# Patient Record
Sex: FEMALE | Race: WHITE | NOT HISPANIC OR LATINO | URBAN - METROPOLITAN AREA
[De-identification: names, ages, dates, MRNs, and addresses within clinical notes are randomized per-mention and may not be internally consistent; named-entity substitution may affect disease eponyms.]

---

## 2017-05-17 ENCOUNTER — ALLSCRIPTS OFFICE VISIT (OUTPATIENT)
Dept: OTHER | Facility: OTHER | Age: 77
End: 2017-05-17

## 2017-09-14 ENCOUNTER — ALLSCRIPTS OFFICE VISIT (OUTPATIENT)
Dept: OTHER | Facility: OTHER | Age: 77
End: 2017-09-14

## 2018-01-11 NOTE — PSYCH
Psych Med Mgmt    Appearance: was calm and cooperative  Observed mood: mood appropriate  Observed mood: affect appropriate  Speech: a normal rate  Thought processes: coherent/organized  Hallucinations: no hallucinations present  Thought Content: no delusions  Abnormal Thoughts: The patient has no suicidal thoughts and no homicidal thoughts  Orientation: The patient is oriented to person, place and time  Recent and Remote Memory: short term memory intact and long term memory intact  Attention Span And Concentration: concentration intact  Insight: Insight intact  Judgment: Her judgment was intact  Muscle Strength And Tone  Normal gait and station  Treatment Recommendations: Since on Neurontin 300 mg po tid will reduce klonopin to 0 5 mg po bid prn and 2 po qhs  Prozac 40 mg po qd  Zyprexa 10 mg po qhs  Risks, Benefits And Possible Side Effects Of Medications: Risks, benefits, and possible side effects of medications explained to patient and patient verbalizes understanding  Discussed with patient the risks of sedation, respiratory depression, impairment of ability to drive and potential for abuse and addiction related to treatment with benzodiazepine medications  The patient understands risk of treatment with benzodiazepine medications, agrees to not drive if feels impaired and agrees to take medications as prescribed  She reports normal appetite, normal energy level, no weight change and normal number of sleep hours  Pt seen for follow up Bipolar DIsorder  She had a bowel blockage 2 months ago and was at [facility] for 5 days for this  She also has an incisional hernia  Pt seen with spouse today and overall appears at her baseline psychiatrically  Sleeping well and appetite adequate- no anxiety or panic attacks  No depressive episodes recently  She does housework and spends time with family     She was started on Neurontin 300 mg po tid yesterday for neuropathy and spouse notes some sleepiness from that  Vitals  Signs   Recorded: 22Nov2016 03:42PM   Heart Rate: 65  Respiration: 16  Systolic: 267  Diastolic: 60  Height: 5 ft 1 5 in  Weight: 158 lb   BMI Calculated: 29 37  BSA Calculated: 1 72    DSM    Provisional Diagnosis: Bipolar Disorder  Assessment    1  Bipolar affective disorder, current episode mild (296 80) (F31 9)    Plan    1  From  ClonazePAM 0 5 MG Oral Tablet 1 tab po bid and 2 po qhs To   ClonazePAM 0 5 MG Oral Tablet 1 tab po bid as needded for anxiety and 2 po qhs   2  FLUoxetine HCl - 40 MG Oral Capsule; 1 cap po QD    Review of Systems    Constitutional: No fever, no chills, feels well, no tiredness, no recent weight gain or loss  Cardiovascular: no complaints of slow or fast heart rate, no chest pain, no palpitations  Respiratory: no complaints of shortness of breath, no wheezing, no dyspnea on exertion  Gastrointestinal: no complaints of abdominal pain, no constipation, no nausea, no diarrhea, no vomiting  Genitourinary: no complaints of dysuria, no incontinence, no pelvic pain, no urinary frequency  Active Problems    1  Bipolar affective disorder, current episode mild (296 80) (F31 9)    Allergies    1  Darvon   2  Sulfa Drugs    Current Meds   1  ClonazePAM 0 5 MG Oral Tablet; 1 tab po bid and 2 po qhs;   Therapy: 37SUE2878 to (Last Rx:22Xqy2321)  Requested for: 07JAU4473 Ordered   2  Fenofibrate 160 MG Oral Tablet; Therapy: 87ZAW5069 to Recorded   3  FLUoxetine HCl - 40 MG Oral Capsule; 1 cap po QD; Therapy: 11LLB4904 to (Last Rx:44Kvu4209)  Requested for: 02EBZ6405 Ordered   4  Hydrocodone-Acetaminophen 7 5-325 MG Oral Tablet; Therapy: 16HZP3427 to Recorded   5  OLANZapine 10 MG Oral Tablet; TAKE 1 TABLET BY MOUTH EVERY DAY AT BEDTIME; Therapy: 98CAZ6423 to (Evaluate:40Hmz5462)  Requested for: 61WLL6797; Last   Rx:11Nov2016 Ordered   6  Triamterene-HCTZ 37 5-25 MG Oral Tablet;    Therapy: (Recorded:00Wir8773) to Recorded    The medication list was reviewed and updated today  Family Psych History  Mother    1  Family history of Anxiety  Father    2  Family history of alcohol abuse (V61 41) (Z81 1)    The family history was reviewed and updated today  Social History    · Current every day smoker (305 1) (F17 200)   · History of Light cigarette smoker (1-9 cigarettes per day) (305 1) (F17 210)   · No alcohol use  The social history was reviewed and updated today  The social history was reviewed and is unchanged  End of Encounter Meds    1  ClonazePAM 0 5 MG Oral Tablet; 1 tab po bid as needded for anxiety and 2 po qhs;   Therapy: 15FPE5844 to (Last Rx:22Nov2016)  Requested for: 22Nov2016 Ordered   2  FLUoxetine HCl - 40 MG Oral Capsule; 1 cap po QD; Therapy: 13HJD8947 to (Last Rx:22Nov2016)  Requested for: 22Nov2016 Ordered   3  OLANZapine 10 MG Oral Tablet; TAKE 1 TABLET BY MOUTH EVERY DAY AT BEDTIME; Therapy: 02UQL6373 to (Evaluate:64Lkh4911)  Requested for: 51FFK1138; Last   Rx:11Nov2016 Ordered    4  Fenofibrate 160 MG Oral Tablet; Therapy: 03UIR5990 to Recorded   5  Hydrocodone-Acetaminophen 7 5-325 MG Oral Tablet; Therapy: 44GGW6292 to Recorded   6  Triamterene-HCTZ 37 5-25 MG Oral Tablet;    Therapy: (Recorded:29Uyp0523) to Recorded    Signatures   Electronically signed by : Jayesh Aguirre [facility]; Nov 22 2016  3:55PM EST                       (Author)    Electronically signed by : Veronica Mayberry MD; Nov 23 2016  3:39PM EST

## 2018-01-12 NOTE — PSYCH
Psych Med Mgmt    Appearance: was calm and cooperative, adequate hygiene and grooming and good eye contact  Observed mood: was dysphoric and depressed  Observed mood: affect was constricted  Speech: a normal rate  Thought processes: coherent/organized  Hallucinations: no hallucinations present  Thought Content: no delusions  Abnormal Thoughts: The patient has no suicidal thoughts  Orientation: The patient is oriented to person, place and time  Recent and Remote Memory: short term memory intact and long term memory intact  Attention Span And Concentration: concentration intact  Insight: Insight intact  Judgment: Her judgment was intact  Muscle Strength And Tone  slow gait  Treatment Recommendations: Decrease Clonazepam 0 5 mg 2 po qhs  Zyprexa 10 mg po qhs  Prozac 40 mg po qd  increase gabapentin to 200 mg po tid    Me[employer] on Wheel- pt encouraged to call and given info  Risks, Benefits And Possible Side Effects Of Medications: Risks, benefits, and possible side effects of medications explained to patient and patient verbalizes understanding  She reports decreased appetite, decreased energy, no weight change and decrease in number of sleep hours   Pt seen accompanied by daughter  Pt and daughter reports she has been more forgetful  Apparently she went to a store and does not recall being there- she has had cognitive testing by PCP and this was normal  She is taking Percocet 7 5 mg dose every 4- 6 hours and also taking klonopin during day which is likely cause for her forgetfulness  We discussed at our last visit not taking klonopn during te day and not taking the same time as the percocet  She did have stimulator place 2 days ago for pain in back in hopes of reducing percocet  She was also started on Gabapentin 100 mg po tid for pain  She states she has no appetite but has not not lost any weight   Her primary complaint is pain and states she feels more depressed at times due to chronic pain  Vitals  Signs   Recorded: 14Sep2017 02:14PM   Respiration: 16  Height: 5 ft 1 5 in  Weight: 161 lb   BMI Calculated: 29 93  BSA Calculated: 1 73    DSM    Provisional Diagnosis: Bipolar Disorder  Assessment    1  Bipolar affective disorder, current episode mild (296 80) (F31 9)    Plan    1  Gabapentin 100 MG Oral Capsule; 2 po tid   2  From  ClonazePAM 0 5 MG Oral Tablet 1 tab po bid as needded for anxiety and   2 po qhs To ClonazePAM 0 5 MG Oral Tablet 2 po QHS PRN   3  FLUoxetine HCl - 40 MG Oral Capsule; 1 cap po QD   4  OLANZapine 10 MG Oral Tablet; TAKE 1 TABLET BY MOUTH EVERY DAY AT   BEDTIME    Review of Systems    Constitutional: No fever, no chills, feels well, no tiredness, no recent weight gain or loss  Cardiovascular: no complaints of slow or fast heart rate, no chest pain, no palpitations  Respiratory: no complaints of shortness of breath, no wheezing, no dyspnea on exertion  Gastrointestinal: no complaints of abdominal pain, no constipation, no nausea, no diarrhea, no vomiting  Genitourinary: no complaints of dysuria, no incontinence, no pelvic pain, no urinary frequency  Musculoskeletal: chronic back pain  Integumentary: no complaints of skin rash, no itching, no dry skin  Active Problems    1  Bipolar affective disorder, current episode mild (296 80) (F31 9)    Allergies    1  Darvon   2  Sulfa Drugs    Current Meds   1  ClonazePAM 0 5 MG Oral Tablet; 1 tab po bid as needded for anxiety and 2 po qhs;   Therapy: 90NZZ5396 to (Last Rx:17May2017)  Requested for: 68MBU0361 Ordered   2  Fenofibrate 160 MG Oral Tablet; Therapy: 58ICC5954 to Recorded   3  FLUoxetine HCl - 40 MG Oral Capsule; 1 cap po QD; Therapy: 56KDS7296 to (Last Rx:17May2017)  Requested for: 40YLG0567 Ordered   4  Hydrocodone-Acetaminophen 7 5-325 MG Oral Tablet; Therapy: 83PYZ9280 to Recorded   5  OLANZapine 10 MG Oral Tablet; TAKE 1 TABLET BY MOUTH EVERY DAY AT BEDTIME;    Therapy: 47TIJ2522 to (Evaluate:20Une0964)  Requested for: 14TBM3800; Last   Rx:94Nqq9890 Ordered   6  Triamterene-HCTZ 37 5-25 MG Oral Tablet; Therapy: (Recorded:24Kks9290) to Recorded    The medication list was reviewed and updated today  Family Psych History  Mother    1  Family history of Anxiety  Father    2  Family history of alcohol abuse (V61 41) (Z81 1)    Social History    · Current every day smoker (305 1) (F17 200)   · History of Light cigarette smoker (1-9 cigarettes per day) (305 1) (F17 210)   · No alcohol use  The social history was reviewed and is unchanged  End of Encounter Meds    1  ClonazePAM 0 5 MG Oral Tablet; 2 po QHS PRN; Therapy: 33SJI0220 to (Last Rx:51Uuf2545)  Requested for: 60Imr0720 Ordered   2  FLUoxetine HCl - 40 MG Oral Capsule; 1 cap po QD; Therapy: 30CDN7428 to (Last Rx:59Hfq3009)  Requested for: 94Wkm6357 Ordered   3  Gabapentin 100 MG Oral Capsule; 2 po tid; Therapy: 43Vih8907 to (Last Rx:88Tnm0031)  Requested for: 29Vex6349 Ordered   4  OLANZapine 10 MG Oral Tablet; TAKE 1 TABLET BY MOUTH EVERY DAY AT BEDTIME; Therapy: 07EPI1880 to (XMLNWYTA:72AAC2618)  Requested for: 45Sfc8693; Last   Rx:92Tio8392 Ordered    5  Fenofibrate 160 MG Oral Tablet; Therapy: 58YEH3838 to Recorded   6  Hydrocodone-Acetaminophen 7 5-325 MG Oral Tablet; Therapy: 74IIZ1297 to Recorded   7  Triamterene-HCTZ 37 5-25 MG Oral Tablet;    Therapy: (Recorded:29Ybn3549) to Recorded    Signatures   Electronically signed by : Starla Ruvalcaba [facility]; Sep 14 2017  2:38PM EST                       (Author)    Electronically signed by : Joanna Guillen MD; Sep 14 2017  4:21PM EST

## 2018-01-13 NOTE — PSYCH
Psych Med Mgmt    Appearance: was calm and cooperative  Observed mood: was dysphoric  Observed mood: affect was constricted  Speech: a normal rate  Thought processes: coherent/organized  Hallucinations: no hallucinations present  Thought Content: no delusions  Abnormal Thoughts: The patient has no suicidal thoughts and no homicidal thoughts  Orientation: The patient is oriented to person, place and time  Recent and Remote Memory: short term memory intact and long term memory intact  Attention Span And Concentration: concentration intact  Insight: Insight intact  Judgment: Her judgment was intact  Muscle Strength And Tone  slow gait  Treatment Recommendations: Clonazepam 0 5 mg po bid and 2 po qhs  Zyprexa 10 mg po q hs  Prozac 40 mg po qd  Risks, Benefits And Possible Side Effects Of Medications: Risks, benefits, and possible side effects of medications explained to patient and patient verbalizes understanding  She reports normal appetite, decreased energy, no weight change and normal number of sleep hours  Pt seen for follow up Bipolar Disorder/ med check  Pt continues with complaints of severe back pain which is chronic  She does see pain management for this  She is on pain medication- she does sleep well at night  She states she feels better when she lies flat  She has a home help aid come in 5 times a week and she enjoys that  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 [employer]  Good relationship with spouse  Vitals  Signs [Data Includes: Current Encounter]   Recorded: [id number] 02:22PM   Heart Rate: 75  Respiration: 16  Systolic: 393  Diastolic: 63  Recorded: 98YOX0924 02:18PM   Respiration: 16  Recorded: 79ZLV0960 02:13PM   Height: 5 ft 1 5 in  Weight: 158 lb   BMI Calculated: 29 37  BSA Calculated: 1 72    DSM    Provisional Diagnosis: Bipolar Disorder  Assessment    1  Bipolar affective disorder, current episode mild (296 80) (F31 9)    Plan    1   ClonazePAM 0 5 MG Oral Tablet; 1 tab po bid and 2 po qhs   2  FLUoxetine HCl - 40 MG Oral Capsule; 1 cap po QD   3  OLANZapine 10 MG Oral Tablet; 1 tab po qhs    Review of Systems    Constitutional: No fever, no chills, feels well, no tiredness, no recent weight gain or loss  Cardiovascular: no complaints of slow or fast heart rate, no chest pain, no palpitations  Respiratory: no complaints of shortness of breath, no wheezing, no dyspnea on exertion  Gastrointestinal: no complaints of abdominal pain, no constipation, no nausea, no diarrhea, no vomiting  Genitourinary: no complaints of dysuria, no incontinence, no pelvic pain, no urinary frequency  Musculoskeletal: chronic pain  Integumentary: no complaints of skin rash, no itching, no dry skin  Neurological: no complaints of headache, no confusion, no numbness, no dizziness  Active Problems    1  Bipolar affective disorder, current episode mild (296 80) (F31 9)    Allergies    1  Darvon   2  Sulfa Drugs    Current Meds   1  ClonazePAM 0 5 MG Oral Tablet; 1 tab po bid and 2 po qhs;   Therapy: 38EMD1533 to (Last Rx:11Mar2016)  Requested for: 12GWB8369; Status: ACTIVE -   Renewal Denied Ordered   2  Fenofibrate 160 MG Oral Tablet; Therapy: 64NOY3098 to Recorded   3  FLUoxetine HCl - 40 MG Oral Capsule; 1 cap po QD; Therapy: 47JDT5243 to (Last Rx:11Mar2016)  Requested for: 95BVK3576 Ordered   4  Hydrocodone-Acetaminophen 7 5-325 MG Oral Tablet; Therapy: 73MTR0536 to Recorded   5  OLANZapine 10 MG Oral Tablet; 1 tab po qhs;   Therapy: 79XZJ8166 to (Last Rx:22Apr2016)  Requested for: 22Apr2016 Ordered   6  Triamterene-HCTZ 37 5-25 MG Oral Tablet; Therapy: (Recorded:63Ahb1806) to Recorded    The medication list was reviewed and updated today  Family Psych History  Mother    1  Family history of Anxiety  Father    2  Family history of alcohol abuse (V61 41) (Z81 1)    The family history was reviewed and updated today         Social History    · Current every day smoker (305  1) (F17 200)   · Light cigarette smoker (1-9 cigarettes per day) (305 1) (F17 210)   · No alcohol use  The social history was reviewed and updated today  The social history was reviewed and is unchanged  End of Encounter Meds    1  ClonazePAM 0 5 MG Oral Tablet; 1 tab po bid and 2 po qhs;   Therapy: 70EHY4710 to (Last Rx:34Myf1315)  Requested for: 90XDU9235 Ordered   2  FLUoxetine HCl - 40 MG Oral Capsule; 1 cap po QD; Therapy: 50YOK5075 to (Last Rx:34Aif8628)  Requested for: 41ZIS3544 Ordered   3  OLANZapine 10 MG Oral Tablet; 1 tab po qhs;   Therapy: 67LXZ2937 to (Last Rx:86Mbl7454)  Requested for: 52JAC2479 Ordered    4  Fenofibrate 160 MG Oral Tablet; Therapy: 51YIL9528 to Recorded   5  Hydrocodone-Acetaminophen 7 5-325 MG Oral Tablet; Therapy: 70UAY5552 to Recorded   6  Triamterene-HCTZ 37 5-25 MG Oral Tablet;    Therapy: (Recorded:52Krb4719) to Recorded    Signatures   Electronically signed by : Su Becerra, [facility]; Jul 8 2016  2:25PM EST                       (Author)    Electronically signed by : Oscar Jaimes MD; Jul 11 2016  4:36PM EST

## 2018-01-14 VITALS — BODY MASS INDEX: 29.63 KG/M2 | WEIGHT: 161 LBS | HEIGHT: 62 IN | RESPIRATION RATE: 16 BRPM

## 2018-01-14 NOTE — PSYCH
Psych Med Mgmt    Appearance: was calm and cooperative  Observed mood: mood appropriate  Observed mood: affect appropriate  Speech: a normal rate  Thought processes: coherent/organized  Hallucinations: no hallucinations present  Individual Psychotherapy minutes provided today  Goals addressed in session: Discussed stressors with daughter and reaching out to her daughter by sending a card and calling her       Treatment Recommendations: Prozac 40 mg po qd  Zyprexa 10 mg po qhs  Clonazepam 0 5 mg po bid prn and 2 po qhs  Risks, Benefits And Possible Side Effects Of Medications: Risks, benefits, and possible side effects of medications explained to patient and patient verbalizes understanding  Discussed with patient Black Box warning on concurrent use of benzodiazepines and opioid medications including sedation, respiratory depression, coma and death  Patient understands the risk of treatment with benzodiazepines in addition to opioids and wants to continue taking those medications  She reports normal appetite, normal energy level, no weight change and normal number of sleep hours  Pt seen for follow up Bipolar Disorder  Pt states she has been increased back pain  She is seeing pain management and they are recommended and implantable device per the patient and her caregiver  she states she has 2 caregivers that come into the home to help her and spouse and this has been helpful  She states that her moods have been stable  She notes that she has stressors with her daughter though who has not been contacting her or her spouse  Sleeping well with Clonazepam at night- she states she prefers to stay up late and then sleeps in the morning  Appetite is fair- she has been eating a lot of pudding and candy and advised her to limit that        Vitals  Signs   Recorded: 19ISE3372 01:14PM   Respiration: 16  Height: 5 ft 1 5 in  Weight: 163 lb   BMI Calculated: 30 3  BSA Calculated: 1  76    DSM    Provisional Diagnosis: Bipolar Disorder  Assessment    1  Bipolar affective disorder, current episode mild (296 80) (F31 9)    Plan    1  ClonazePAM 0 5 MG Oral Tablet; 1 tab po bid as needded for anxiety and 2 po   qhs   2  FLUoxetine HCl - 40 MG Oral Capsule; 1 cap po QD   3  OLANZapine 10 MG Oral Tablet; TAKE 1 TABLET BY MOUTH EVERY DAY AT   BEDTIME    Review of Systems    Constitutional: No fever, no chills, feels well, no tiredness, no recent weight gain or loss  Cardiovascular: no complaints of slow or fast heart rate, no chest pain, no palpitations  Respiratory: no complaints of shortness of breath, no wheezing, no dyspnea on exertion  Genitourinary: no complaints of dysuria, no incontinence, no pelvic pain, no urinary frequency  Musculoskeletal: Chronic back pain  Active Problems    1  Bipolar affective disorder, current episode mild (296 80) (F31 9)    Allergies    1  Darvon   2  Sulfa Drugs    Current Meds   1  ClonazePAM 0 5 MG Oral Tablet; 1 tab po bid as needded for anxiety and 2 po qhs;   Therapy: 48TXO3352 to (Last Rx:22Nov2016)  Requested for: 75TZN9815; Status: ACTIVE   - Renewal Denied Ordered   2  Fenofibrate 160 MG Oral Tablet; Therapy: 73FQB7252 to Recorded   3  FLUoxetine HCl - 40 MG Oral Capsule; 1 cap po QD; Therapy: 45LGS3050 to (Last Rx:22Nov2016)  Requested for: 22Nov2016 Ordered   4  Hydrocodone-Acetaminophen 7 5-325 MG Oral Tablet; Therapy: 63OTN9710 to Recorded   5  OLANZapine 10 MG Oral Tablet; TAKE 1 TABLET BY MOUTH EVERY DAY AT BEDTIME; Therapy: 05FSG1236 to (Evaluate:87Fmp7636)  Requested for: 34WFV2220; Last   Rx:11Nov2016 Ordered   6  Triamterene-HCTZ 37 5-25 MG Oral Tablet; Therapy: (Recorded:71Ezk2447) to Recorded    The medication list was reviewed and updated today  Family Psych History  Mother    1  Family history of Anxiety  Father    2   Family history of alcohol abuse (V61 41) (Z81 1)    The family history was reviewed and updated today  Social History    · Current every day smoker (305 1) (F17 200)   · History of Light cigarette smoker (1-9 cigarettes per day) (305 1) (F17 210)   · No alcohol use    End of Encounter Meds    1  ClonazePAM 0 5 MG Oral Tablet; 1 tab po bid as needded for anxiety and 2 po qhs;   Therapy: 17QCM8298 to (Last Rx:17May2017)  Requested for: 45OBV3786 Ordered   2  FLUoxetine HCl - 40 MG Oral Capsule; 1 cap po QD; Therapy: 33GUF1654 to (Last Rx:17May2017)  Requested for: 44SJK3516 Ordered   3  OLANZapine 10 MG Oral Tablet; TAKE 1 TABLET BY MOUTH EVERY DAY AT BEDTIME; Therapy: 26QRY3306 to (Evaluate:13Nov2017)  Requested for: 63RSZ3722; Last   Rx:17May2017 Ordered    4  Fenofibrate 160 MG Oral Tablet; Therapy: 20DTN0006 to Recorded   5  Hydrocodone-Acetaminophen 7 5-325 MG Oral Tablet; Therapy: 49NNX2352 to Recorded   6  Triamterene-HCTZ 37 5-25 MG Oral Tablet;    Therapy: (Recorded:31Hks4636) to Recorded    Signatures   Electronically signed by : Lura Holstein, [facility]; May 17 2017  1:34PM EST                       (Author)    Electronically signed by : Marychuy Moreira MD; May 17 2017  1:41PM EST
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
## 2018-01-22 VITALS — RESPIRATION RATE: 16 BRPM | BODY MASS INDEX: 30 KG/M2 | HEIGHT: 62 IN | WEIGHT: 163 LBS
